# Patient Record
Sex: FEMALE | Race: WHITE | Employment: OTHER | URBAN - METROPOLITAN AREA
[De-identification: names, ages, dates, MRNs, and addresses within clinical notes are randomized per-mention and may not be internally consistent; named-entity substitution may affect disease eponyms.]

---

## 2019-04-17 ENCOUNTER — TRANSFERRED RECORDS (OUTPATIENT)
Dept: HEALTH INFORMATION MANAGEMENT | Facility: CLINIC | Age: 79
End: 2019-04-17

## 2019-06-03 ENCOUNTER — OFFICE VISIT (OUTPATIENT)
Dept: INTERNAL MEDICINE | Facility: CLINIC | Age: 79
End: 2019-06-03
Payer: MEDICARE

## 2019-06-03 VITALS
TEMPERATURE: 97.7 F | DIASTOLIC BLOOD PRESSURE: 80 MMHG | RESPIRATION RATE: 14 BRPM | HEART RATE: 67 BPM | BODY MASS INDEX: 33.43 KG/M2 | WEIGHT: 213 LBS | HEIGHT: 67 IN | OXYGEN SATURATION: 93 % | SYSTOLIC BLOOD PRESSURE: 173 MMHG

## 2019-06-03 DIAGNOSIS — E83.52 HYPERCALCEMIA: Primary | ICD-10-CM

## 2019-06-03 LAB — PTH-INTACT SERPL-MCNC: 146 PG/ML (ref 18–80)

## 2019-06-03 PROCEDURE — 80053 COMPREHEN METABOLIC PANEL: CPT | Performed by: FAMILY MEDICINE

## 2019-06-03 PROCEDURE — 36415 COLL VENOUS BLD VENIPUNCTURE: CPT | Performed by: FAMILY MEDICINE

## 2019-06-03 PROCEDURE — 84443 ASSAY THYROID STIM HORMONE: CPT | Performed by: FAMILY MEDICINE

## 2019-06-03 PROCEDURE — 83970 ASSAY OF PARATHORMONE: CPT | Performed by: FAMILY MEDICINE

## 2019-06-03 PROCEDURE — 99213 OFFICE O/P EST LOW 20 MIN: CPT | Performed by: FAMILY MEDICINE

## 2019-06-03 RX ORDER — MONTELUKAST SODIUM 10 MG/1
10 TABLET ORAL AT BEDTIME
COMMUNITY

## 2019-06-03 RX ORDER — SIMVASTATIN 10 MG
10 TABLET ORAL AT BEDTIME
COMMUNITY

## 2019-06-03 RX ORDER — LOSARTAN POTASSIUM 25 MG/1
25 TABLET ORAL DAILY
COMMUNITY

## 2019-06-03 RX ORDER — AMLODIPINE BESYLATE AND ATORVASTATIN CALCIUM 5; 10 MG/1; MG/1
1 TABLET, FILM COATED ORAL DAILY
COMMUNITY

## 2019-06-03 RX ORDER — IPRATROPIUM BROMIDE 21 UG/1
2 SPRAY, METERED NASAL EVERY 12 HOURS
COMMUNITY

## 2019-06-03 SDOH — HEALTH STABILITY: MENTAL HEALTH: HOW OFTEN DO YOU HAVE A DRINK CONTAINING ALCOHOL?: NEVER

## 2019-06-03 ASSESSMENT — MIFFLIN-ST. JEOR: SCORE: 1473.79

## 2019-06-03 NOTE — PROGRESS NOTES
"  CHIEF COMPLAINT    Issue with calcium and thyroid.      HISTORY    Roberta, who is usually spends most of her time in [place], had some labs done prior to her departure.  Her calcium level was 0.2 mg elevated although when repeated was in the normal range.  Her doctor ordered a PTH level which was moderately elevated.  Some of these tests came back just before she was ready to leave [place] so she is a bit unclear of their significance.    She is not having any adverse symptoms.  She has had no pathological fractures.    She states she had a bone density study done about 5 or 6 years ago which was normal.    Patient did say she was taking some extra calcium and added milk to her coffee and she discontinued these.      History reviewed. No pertinent past medical history.    Current Outpatient Medications   Medication Sig Dispense Refill     amLODIPine-atorvastatin (CADUET) 5-10 MG tablet Take 1 tablet by mouth daily       ATENOLOL PO Take 50 mg by mouth daily       ipratropium (ATROVENT) 0.03 % nasal spray Spray 2 sprays into both nostrils every 12 hours       losartan (COZAAR) 25 MG tablet Take 25 mg by mouth daily       montelukast (SINGULAIR) 10 MG tablet Take 10 mg by mouth At Bedtime       simvastatin (ZOCOR) 10 MG tablet Take 10 mg by mouth At Bedtime         REVIEW OF SYSTEMS    Unremarkable except as above.      History reviewed. No pertinent past medical history.      EXAM  /80 (BP Location: Right arm, Patient Position: Sitting, Cuff Size: Adult Large)   Pulse 67   Temp 97.7  F (36.5  C) (Oral)   Resp 14   Ht 1.702 m (5' 7\")   Wt 96.6 kg (213 lb)   LMP  (LMP Unknown)   SpO2 93%   BMI 33.36 kg/m      HEENT unremarkable.  Neck no thyromegaly or mass.  Nonlabored breathing.      (E83.52) Hypercalcemia  (primary encounter diagnosis)  Comment:     Some of her lab work appeared to be rather marginal.  I have repeated these values today.  I did ask her to return for a follow-up visit so we can " discuss.  She is agreeable with such a plan.    Plan: Comprehensive metabolic panel (BMP + Alb, Alk         Phos, ALT, AST, Total. Bili, TP), TSH with free        T4 reflex, Parathyroid Hormone Intact

## 2019-06-04 LAB
ALBUMIN SERPL-MCNC: 3.9 G/DL (ref 3.4–5)
ALP SERPL-CCNC: 70 U/L (ref 40–150)
ALT SERPL W P-5'-P-CCNC: 23 U/L (ref 0–50)
ANION GAP SERPL CALCULATED.3IONS-SCNC: 8 MMOL/L (ref 3–14)
AST SERPL W P-5'-P-CCNC: 20 U/L (ref 0–45)
BILIRUB SERPL-MCNC: 0.6 MG/DL (ref 0.2–1.3)
BUN SERPL-MCNC: 22 MG/DL (ref 7–30)
CALCIUM SERPL-MCNC: 9.8 MG/DL (ref 8.5–10.1)
CHLORIDE SERPL-SCNC: 105 MMOL/L (ref 94–109)
CO2 SERPL-SCNC: 26 MMOL/L (ref 20–32)
CREAT SERPL-MCNC: 0.72 MG/DL (ref 0.52–1.04)
GFR SERPL CREATININE-BSD FRML MDRD: 79 ML/MIN/{1.73_M2}
GLUCOSE SERPL-MCNC: 107 MG/DL (ref 70–99)
POTASSIUM SERPL-SCNC: 4.2 MMOL/L (ref 3.4–5.3)
PROT SERPL-MCNC: 7.1 G/DL (ref 6.8–8.8)
SODIUM SERPL-SCNC: 139 MMOL/L (ref 133–144)
TSH SERPL DL<=0.005 MIU/L-ACNC: 1.66 MU/L (ref 0.4–4)

## 2019-06-10 ENCOUNTER — OFFICE VISIT (OUTPATIENT)
Dept: INTERNAL MEDICINE | Facility: CLINIC | Age: 79
End: 2019-06-10
Payer: MEDICARE

## 2019-06-10 VITALS
WEIGHT: 213.9 LBS | TEMPERATURE: 97.7 F | BODY MASS INDEX: 33.57 KG/M2 | DIASTOLIC BLOOD PRESSURE: 70 MMHG | RESPIRATION RATE: 14 BRPM | HEART RATE: 70 BPM | SYSTOLIC BLOOD PRESSURE: 130 MMHG | HEIGHT: 67 IN | OXYGEN SATURATION: 94 %

## 2019-06-10 DIAGNOSIS — Z78.0 POSTMENOPAUSE: ICD-10-CM

## 2019-06-10 DIAGNOSIS — R79.89 INCREASED PTH LEVEL: Primary | ICD-10-CM

## 2019-06-10 PROCEDURE — 99213 OFFICE O/P EST LOW 20 MIN: CPT | Performed by: FAMILY MEDICINE

## 2019-06-10 ASSESSMENT — MIFFLIN-ST. JEOR: SCORE: 1477.87

## 2019-06-10 NOTE — PROGRESS NOTES
"  CHIEF COMPLAINT    Follow-up hypercalcemia, elevated PTH.      HISTORY    See previous note.  She had some abnormal readings down in [place] including mildly elevated calcium and elevated PTH.  We obtain repeat readings.    Patient notes that she has no history of fractures or trouble with her spine.  She has a remote history of having a bone density study.    She is a non-smoker.      There is no problem list on file for this patient.    Current Outpatient Medications   Medication Sig Dispense Refill     amLODIPine-atorvastatin (CADUET) 5-10 MG tablet Take 1 tablet by mouth daily       ATENOLOL PO Take 50 mg by mouth daily       ipratropium (ATROVENT) 0.03 % nasal spray Spray 2 sprays into both nostrils every 12 hours       losartan (COZAAR) 25 MG tablet Take 25 mg by mouth daily       montelukast (SINGULAIR) 10 MG tablet Take 10 mg by mouth At Bedtime       simvastatin (ZOCOR) 10 MG tablet Take 10 mg by mouth At Bedtime         REVIEW OF SYSTEMS     unremarkable other than above.      SOCIAL HISTORY    Patient's residence is in [place].  She will be  In [place] through July 1.      History reviewed. No pertinent past medical history.      EXAM  /70 (BP Location: Right arm, Patient Position: Sitting, Cuff Size: Adult Large)   Pulse 70   Temp 97.7  F (36.5  C) (Oral)   Resp 14   Ht 1.702 m (5' 7\")   Wt 97 kg (213 lb 14.4 oz)   LMP  (LMP Unknown)   SpO2 94%   BMI 33.50 kg/m          Results for orders placed or performed in visit on 06/03/19   Comprehensive metabolic panel (BMP + Alb, Alk Phos, ALT, AST, Total. Bili, TP)   Result Value Ref Range    Sodium 139 133 - 144 mmol/L    Potassium 4.2 3.4 - 5.3 mmol/L    Chloride 105 94 - 109 mmol/L    Carbon Dioxide 26 20 - 32 mmol/L    Anion Gap 8 3 - 14 mmol/L    Glucose 107 (H) 70 - 99 mg/dL    Urea Nitrogen 22 7 - 30 mg/dL    Creatinine 0.72 0.52 - 1.04 mg/dL    GFR Estimate 79 >60 mL/min/[1.73_m2]    GFR Estimate If Black >90 >60 mL/min/[1.73_m2]    " Calcium 9.8 8.5 - 10.1 mg/dL    Bilirubin Total 0.6 0.2 - 1.3 mg/dL    Albumin 3.9 3.4 - 5.0 g/dL    Protein Total 7.1 6.8 - 8.8 g/dL    Alkaline Phosphatase 70 40 - 150 U/L    ALT 23 0 - 50 U/L    AST 20 0 - 45 U/L   TSH with free T4 reflex   Result Value Ref Range    TSH 1.66 0.40 - 4.00 mU/L   Parathyroid Hormone Intact   Result Value Ref Range    Parathyroid Hormone Intact 146 (H) 18 - 80 pg/mL       (E34.9) Increased PTH level  (primary encounter diagnosis)  Comment:     Possible condition for her is normocalcemic hyperparathyroidism.  Plan:   Gave her copies of today's labs so that she can show them at any follow-up.  She did request a bone density study be done here so this was ordered.  See below.  Also she had been taking some calcium supplement and then held off but I think she should stay on her supplemental calcium and this was recommended.      (Z78.0) Postmenopause  Comment:   Plan: DX Hip/Pelvis/Spine

## 2019-06-13 ENCOUNTER — HOSPITAL ENCOUNTER (OUTPATIENT)
Dept: BONE DENSITY | Facility: CLINIC | Age: 79
Discharge: HOME | End: 2019-06-13
Attending: FAMILY MEDICINE | Admitting: FAMILY MEDICINE
Payer: MEDICARE

## 2019-06-13 DIAGNOSIS — Z78.0 POSTMENOPAUSE: ICD-10-CM

## 2019-06-13 PROCEDURE — 77080 DXA BONE DENSITY AXIAL: CPT
